# Patient Record
Sex: MALE | Race: WHITE | Employment: OTHER | URBAN - METROPOLITAN AREA
[De-identification: names, ages, dates, MRNs, and addresses within clinical notes are randomized per-mention and may not be internally consistent; named-entity substitution may affect disease eponyms.]

---

## 2018-04-09 ENCOUNTER — ESTABLISHED COMPREHENSIVE EXAM (OUTPATIENT)
Dept: URBAN - METROPOLITAN AREA CLINIC 36 | Facility: CLINIC | Age: 43
End: 2018-04-09

## 2018-04-09 DIAGNOSIS — H52.7: ICD-10-CM

## 2018-04-09 PROCEDURE — 92310-1 LEVEL 1 CONTACT LENS MANAGEMENT

## 2018-04-09 PROCEDURE — 92310SDW STANDARD DAILY WEAR

## 2018-04-09 PROCEDURE — 92015 DETERMINE REFRACTIVE STATE: CPT

## 2018-04-09 PROCEDURE — 92014 COMPRE OPH EXAM EST PT 1/>: CPT

## 2018-04-09 ASSESSMENT — TONOMETRY
OS_IOP_MMHG: 12
OD_IOP_MMHG: 12

## 2018-04-09 ASSESSMENT — VISUAL ACUITY
OD_SC: 20/40-1
OD_SC: J1+
OD_PH: 20/25+2
OS_SC: 20/25-1
OS_SC: J1+

## 2018-04-10 ENCOUNTER — CONTACT LENS - FOLLOW UP (OUTPATIENT)
Dept: URBAN - METROPOLITAN AREA CLINIC 36 | Facility: CLINIC | Age: 43
End: 2018-04-10

## 2018-04-10 DIAGNOSIS — Z97.3: ICD-10-CM

## 2018-04-10 PROCEDURE — 92310F

## 2018-08-14 NOTE — PATIENT DISCUSSION
It was discussed with the patient the importance of good control of their blood sugar, blood pressure, cholesterol, diet, exercise, weight, and medication usage under the guidance of their diabetic doctor to prevent/halt diabetic retinopathy.

## 2018-08-14 NOTE — PATIENT DISCUSSION
Pt is in transition to see another diabetic dr.  Will let me know so I can send the letter when ready.

## 2020-02-12 ENCOUNTER — ESTABLISHED COMPREHENSIVE EXAM (OUTPATIENT)
Dept: URBAN - METROPOLITAN AREA CLINIC 36 | Facility: CLINIC | Age: 45
End: 2020-02-12

## 2020-02-12 DIAGNOSIS — Z97.3: ICD-10-CM

## 2020-02-12 DIAGNOSIS — H52.7: ICD-10-CM

## 2020-02-12 PROCEDURE — 92310-1 LEVEL 1 CONTACT LENS MANAGEMENT

## 2020-02-12 PROCEDURE — 92014 COMPRE OPH EXAM EST PT 1/>: CPT

## 2020-02-12 PROCEDURE — 92310PDW PREMIUM SPECIALTY DAILY WEAR

## 2020-02-12 PROCEDURE — 92015 DETERMINE REFRACTIVE STATE: CPT

## 2020-02-12 ASSESSMENT — VISUAL ACUITY
OS_SC: 20/30-1
OS_SC: J1+
OD_CC: 20/20
OD_SC: J1
OS_CC: J1+
OD_SC: 20/60+2
OD_CC: J1+
OS_CC: 20/20

## 2020-02-12 ASSESSMENT — TONOMETRY
OS_IOP_MMHG: 14
OD_IOP_MMHG: 14

## 2020-11-13 NOTE — PATIENT DISCUSSION
"""S/P IOL OS: Sensar AAB00 23.5 +Omidria. Continue post operative instructions and drops per schedule.  """

## 2021-04-01 ENCOUNTER — ESTABLISHED COMPREHENSIVE EXAM (OUTPATIENT)
Dept: URBAN - METROPOLITAN AREA CLINIC 36 | Facility: CLINIC | Age: 46
End: 2021-04-01

## 2021-04-01 DIAGNOSIS — Z97.3: ICD-10-CM

## 2021-04-01 DIAGNOSIS — H52.7: ICD-10-CM

## 2021-04-01 PROCEDURE — 92310-1 LEVEL 1 CONTACT LENS MANAGEMENT

## 2021-04-01 PROCEDURE — 92012 INTRM OPH EXAM EST PATIENT: CPT

## 2021-04-01 PROCEDURE — 92015 DETERMINE REFRACTIVE STATE: CPT

## 2021-04-01 PROCEDURE — 92310PDW PREMIUM SPECIALTY DAILY WEAR

## 2021-04-01 ASSESSMENT — VISUAL ACUITY
OS_CC: 20/20-2
OS_SC: J1+
OD_CC: 20/20
OD_SC: 20/60-1
OS_SC: 20/30-1
OD_SC: J1+

## 2021-04-01 ASSESSMENT — TONOMETRY
OS_IOP_MMHG: 14
OD_IOP_MMHG: 14

## 2021-05-04 NOTE — PATIENT DISCUSSION
S/P Yag Cap OU. OD 03/26/21, OS 04/02/2021.  Patient doing well and instructed to stop post Yag drops as instructed by Dr. Gavi Mata.

## 2022-05-20 NOTE — PATIENT DISCUSSION
S/P Yag Cap OU. OD 03/26/21, OS 04/02/2021.  Patient doing well and instructed to stop post Yag drops as instructed by Dr. Scar Cochran.

## 2023-04-11 ENCOUNTER — COMPREHENSIVE EXAM (OUTPATIENT)
Dept: URBAN - METROPOLITAN AREA CLINIC 36 | Facility: CLINIC | Age: 48
End: 2023-04-11

## 2023-04-11 DIAGNOSIS — H52.7: ICD-10-CM

## 2023-04-11 DIAGNOSIS — Z97.3: ICD-10-CM

## 2023-04-11 PROCEDURE — 92310PDW PREMIUM SPECIALTY DAILY WEAR

## 2023-04-11 PROCEDURE — 92015 DETERMINE REFRACTIVE STATE: CPT

## 2023-04-11 PROCEDURE — 92014 COMPRE OPH EXAM EST PT 1/>: CPT

## 2023-04-11 PROCEDURE — 92310-2 LEVEL 2 CONTACT LENS MANAGEMENT

## 2023-04-11 ASSESSMENT — VISUAL ACUITY
OD_SC: 20/60-2
OD_SC: J1
OS_SC: 20/50-1
OS_SC: J1
OD_CC: 20/20
OS_CC: 20/40

## 2023-04-11 ASSESSMENT — TONOMETRY
OS_IOP_MMHG: 14
OD_IOP_MMHG: 14

## 2023-05-31 ENCOUNTER — CONTACT LENSES/GLASSES VISIT (OUTPATIENT)
Dept: URBAN - METROPOLITAN AREA CLINIC 36 | Facility: CLINIC | Age: 48
End: 2023-05-31

## 2023-05-31 DIAGNOSIS — Z97.3: ICD-10-CM

## 2023-05-31 PROCEDURE — 92310F

## 2023-05-31 ASSESSMENT — VISUAL ACUITY
OD_CC: 20/20
OS_CC: 20/20-1

## 2025-05-19 ENCOUNTER — COMPREHENSIVE EXAM (OUTPATIENT)
Age: 50
End: 2025-05-19

## 2025-05-19 DIAGNOSIS — H52.7: ICD-10-CM

## 2025-05-19 PROCEDURE — 92014 COMPRE OPH EXAM EST PT 1/>: CPT

## 2025-05-19 PROCEDURE — 92310-1 LEVEL 1 SOFT LENS UPDATE

## 2025-05-19 PROCEDURE — 92015 DETERMINE REFRACTIVE STATE: CPT
